# Patient Record
Sex: MALE | Race: WHITE
[De-identification: names, ages, dates, MRNs, and addresses within clinical notes are randomized per-mention and may not be internally consistent; named-entity substitution may affect disease eponyms.]

---

## 2019-11-07 ENCOUNTER — HOSPITAL ENCOUNTER (OUTPATIENT)
Dept: HOSPITAL 92 - TBSIIMAG | Age: 59
Discharge: HOME | End: 2019-11-07
Attending: NEUROLOGICAL SURGERY
Payer: COMMERCIAL

## 2019-11-07 DIAGNOSIS — M50.30: Primary | ICD-10-CM

## 2019-11-07 DIAGNOSIS — M47.812: ICD-10-CM

## 2019-11-07 PROCEDURE — 72040 X-RAY EXAM NECK SPINE 2-3 VW: CPT

## 2019-11-07 PROCEDURE — 72156 MRI NECK SPINE W/O & W/DYE: CPT

## 2019-11-07 NOTE — RAD
FOUR VIEWS CERVICAL SPINE:



HISTORY: 

Neck pain.



FINDINGS: 

Lateral extension, lateral flexion, lateral swimmer's, and  lateral neutral view of the cervical spin
e are submitted for interpretation.



No prevertebral soft tissue swelling.



Predental space:

Moderate to severe loss of disc space height at C3 5-C6. Moderate loss of disc space height at C6-C7.
 On the swimmer's view, limited evaluation of the cervicothoracic junction.



In the neutral position, no significant spondylolisthesis. There is straightening of normal cervical 
lordosis. No abnormal motion upon extension or flexion.



IMPRESSION:

1. Moderate to severe degenerative change at C5-C6.

2. Moderate degenerative change at C6-C7,



Transcribed Date/Time: 11/7/2019 10:29 AM



Reported By: Reyes Rivera 

Electronically Signed:  11/7/2019 11:40 AM

## 2019-11-07 NOTE — MRI
MRI CERVICAL SPINE WITH AND WITHOUT CONTRAST:



HISTORY: 

Cervical surgery in 2005. Neck pain.



FINDINGS: 

Appropriate T1 marrow signal intensity of the cervical vertebrae from C1 through C6. There is T1 aaron
ow signal hypointensity with associated T2 and STIR hyperintensity involving the inferior plate of

C7 and superior endplate of T1. The intervening disc space does demonstrate some heterogeneous signal
 intensity on the T2 and STIR images suggesting component of fluid. Postcontrast images do

demonstrate mild enhancement of the endplates adjacent to the C7-T1 disc space. There is also mild en
hancement at the disc space. The possibility of discitis osteomyelitis cannot be entirely excluded.

Questionable mild edema involving the prevertebral soft tissues from C6 through T1.



Visualized brain parenchyma, cervicomedullary junction, cervical cord and the upper thoracic cord hav
e a normal size and signal intensity.



C2-C3: Central disc protrusion, without significant central canal stenosis or significant neural fora
susanne narrowing.



C3-C4: Central disc protrusion makes contact upon the thecal sac. Mild central canal stenosis. Mild r
ight neural foraminal narrowing due to uncovertebral hypertrophy. Moderate left neural foraminal

narrowing due to uncovertebral hypertrophy.



C4-C5: Broad-based disc osteophyte complex abuts the thecal sac. No significant central canal stenosi
s. Moderate bilateral foraminal narrowing due to uncovertebral hypertrophy.



C5-C6: Moderate loss of disc space height. Broad-based disc osteophyte complex results in mild centra
l canal stenosis. Moderate to severe right and left foraminal narrowing due to uncovertebral

hypertrophy.



C6-C7: Severe loss of disc space height. No significant central canal stenosis. Mild bilateral forami
nal narrowing due to uncovertebral hypertrophy.



C7-T1 broad-based disc bulge results in mild central canal stenosis. Moderate to severe bilateral lázaro
ral foraminal narrowing.



IMPRESSION:

1. Degenerative changes of the cervical spine as detailed above.

2. No abnormal enhancement with regards to the visualized spinal cord.

3. Possible discitis osteomyelitis at the C7-T1 level which may be superimposed upon chronic endplate
 changes.



CODE T



Transcribed Date/Time: 11/7/2019 1:13 PM



Reported By: Reyes Rivera 

Electronically Signed:  11/7/2019 3:42 PM

## 2019-12-11 ENCOUNTER — HOSPITAL ENCOUNTER (OUTPATIENT)
Dept: HOSPITAL 92 - TBSIIMAG | Age: 59
Discharge: HOME | End: 2019-12-11
Attending: NEUROLOGICAL SURGERY
Payer: COMMERCIAL

## 2019-12-11 DIAGNOSIS — M54.2: ICD-10-CM

## 2019-12-11 DIAGNOSIS — M47.812: ICD-10-CM

## 2019-12-11 DIAGNOSIS — Q76.49: ICD-10-CM

## 2019-12-11 DIAGNOSIS — M62.81: ICD-10-CM

## 2019-12-11 DIAGNOSIS — S12.9XXA: Primary | ICD-10-CM

## 2019-12-11 PROCEDURE — 72125 CT NECK SPINE W/O DYE: CPT

## 2019-12-11 NOTE — CT
CT CERVICAL SPINE:



12/11/2019



HISTORY:

Neck pain, prior cervical spine surgery, prior history of fracture.



TECHNIQUE:

Axial CT imaging through the cervical spine without contrast at 2 mm intervals with the patient in ne
utral, flexion, and extension positions.



FINDINGS:

The imaged lung apices appear unremarkable.



There is moderate degenerative change at the atlantoaxial interspace. There is no widening of the corbin
antoaxial interspace on the flexion, extension or neutral imaging. The craniocervical junction and

the cervicothoracic junction appear intact.



Evaluation for central canal and/or neural foraminal stenosis is limited on routine CT examination.



C2-3: Probable minimal disc bulge. No osseous cause of significant central canal or neural foraminal 
stenosis. No anterolisthesis or retrolisthesis seen on the neutral flexion or extension imaging.



C3-4: There is Suggestion of a small disc osteophyte complex. Probable mild associated central canal 
stenosis. No osseous cause of significant central canal stenosis. Uncovertebral osteophyte

formation on the left causes mild left neural foraminal stenosis. No anterolisthesis or retrolisthesi
s seen on the neutral, flexion, or extension imaging.



C4-5: Bilateral uncovertebral osteophyte formation, left greater than right. At least mild bilateral 
neural foraminal stenosis. Disc space narrowing and mild degenerative endplate change. Minimal

anterolisthesis on flexion imaging, measuring approximately 2-3 mm. No anterolisthesis or retrolisthe
sis noted on the neutral or extension imaging.



C5-6: Bilateral facet and uncovertebral osteophyte formation, right greater than left. There is disc 
space narrowing. There is moderate bilateral neural foraminal stenosis, right greater than left.

There is no anterolisthesis or retrolisthesis noted on the neutral, flexion, or extension imaging.



C6-7: Intervertebral disc device present. Disc space narrowing noted. Mild bilateral uncovertebral os
teophyte formation. No osseous cause of significant central canal or neural foraminal stenosis. No

anterolisthesis or retrolisthesis noted on the neutral, flexion or extension views.



There is an old fracture involving the spinous process of the C7 vertebral body.



C7-T1: There is disc space narrowing and degenerative endplate change with mild bilateral uncovertebr
al osteophyte formation, right greater than left. No osseous cause of significant central canal

stenosis. Mild right neural foraminal stenosis. No osseous cause of significant left neural foraminal
 stenosis. No anterolisthesis or retrolisthesis seen on the flexion, neutral or extension views.



No acute fracture or evidence of dislocation.



IMPRESSION:

Multilevel cervical spine degenerative change as described above.



Transcribed Date/Time: 12/11/2019 11:21 AM



Reported By: Bebo Hernandez 

Electronically Signed:  12/11/2019 1:21 PM